# Patient Record
Sex: FEMALE | Race: WHITE | NOT HISPANIC OR LATINO
[De-identification: names, ages, dates, MRNs, and addresses within clinical notes are randomized per-mention and may not be internally consistent; named-entity substitution may affect disease eponyms.]

---

## 2019-11-26 PROBLEM — Z00.00 ENCOUNTER FOR PREVENTIVE HEALTH EXAMINATION: Status: ACTIVE | Noted: 2019-11-26

## 2019-12-02 ENCOUNTER — APPOINTMENT (OUTPATIENT)
Dept: GYNECOLOGIC ONCOLOGY | Facility: CLINIC | Age: 84
End: 2019-12-02
Payer: MEDICARE

## 2019-12-02 VITALS
OXYGEN SATURATION: 97 % | HEIGHT: 65 IN | DIASTOLIC BLOOD PRESSURE: 72 MMHG | HEART RATE: 78 BPM | SYSTOLIC BLOOD PRESSURE: 118 MMHG | BODY MASS INDEX: 24.66 KG/M2 | WEIGHT: 148 LBS

## 2019-12-02 DIAGNOSIS — N83.209 UNSPECIFIED OVARIAN CYST, UNSPECIFIED SIDE: ICD-10-CM

## 2019-12-02 DIAGNOSIS — R93.89 ABNORMAL FINDINGS ON DIAGNOSTIC IMAGING OF OTHER SPECIFIED BODY STRUCTURES: ICD-10-CM

## 2019-12-02 PROCEDURE — 58100 BIOPSY OF UTERUS LINING: CPT

## 2019-12-02 PROCEDURE — 99205 OFFICE O/P NEW HI 60 MIN: CPT | Mod: 25

## 2019-12-10 ENCOUNTER — TRANSCRIPTION ENCOUNTER (OUTPATIENT)
Age: 84
End: 2019-12-10

## 2019-12-10 NOTE — HISTORY OF PRESENT ILLNESS
[FreeTextEntry1] : Problem\par 1) Thickened endometrium\par 2) Simple Ovarian Cyst\par \par Previous Therapy\par 1) Pelvic US 11/4/19\par    a) Uterus 6.7cm, endometrium is irregular and extremely thickened 2.1cm with trace free fluid. R ovarian simple cyst 3.3cm\par \par 86 yo referred by Dr. Ge for a thickened endometrium and simple ovarian cyst. Patient denies any bleeding. Has issues with incontinence, s/p botox injections with Dr. Ge 2/2019 with a good result. Had US because she was having frequent UTIs and pelvic cramping. No pain now.

## 2019-12-10 NOTE — DISCUSSION/SUMMARY
[FreeTextEntry1] : I discussed with the patient ( with the aid of diagrams, reviewed the findings on history and physical examination, and reviewed the imaging studies in detail. She has a thickened endometrial lining with no abnormal uterine bleeding. \par \par Benign, pre-malignant (such as atypical hyperplasia) and malignant causes of these findings discussed. In order to determine the cause of her symptoms, sampling of the uterus is necessary. The uterus can be sampled either by an in-office endometrial biopsy or via D&C hysteroscopy. In the case of suspected endometrial polyp, a D&C/hysteroscopy is indicated. This allows for removal of the endometrial polyp. This is both diagnostic and therapeutic. \par \par Complications that include, but are not limited to: bleeding, infection and uterine perforation discussed. In the case of uterine perforation, diagnostic laparoscopy may be indicated. I have also provided her with the diagrams. \par \par Surgical scheduling was discussed and instructions for optimization prior to surgery were given. NPO after midnight.  No aspirin or NSAID products for 1 week prior. Instructions for misoprostol 48 hours prior to surgery given.  A copy of the above diagrams was given to the patient. \par \par Ovarian cyst - repeat pelvic US 2/2020 to address for change, not concerning, simple cyst patient asymptomatic\par []Follow up EMB, if negative can proceed with D&C or do saline infusion sonogram first\par \par The total encounter time was 60 minutes, of which over 50% was spent face-to-face, examining and counseling the patient, or coordinating care.  \par

## 2019-12-10 NOTE — PAST MEDICAL HISTORY
[Menarche Age ____] : age at menarche was [unfilled] [Postmenopausal] : The patient is postmenopausal [Approximately ___] : the LMP was approximately [unfilled] [Total Preg ___] : G[unfilled] [Live Births ___] : P[unfilled]  [FreeTextEntry5] : VAGINAL DELIVERY X2

## 2019-12-10 NOTE — PROCEDURE
[Endometrial Biopsy] : an endometrial biopsy [Thickened Endometrium] : thickened endometrium [None] : none [Verbal Consent] : verbal consent was obtained prior to the procedure and is detailed in the patient's record [Patient] : the patient [Yes] : the specimen was sent to pathology [No Complications] : none [FreeTextEntry1] : Uterus sounded to 5cm\par scant tissue

## 2021-11-30 ENCOUNTER — EMERGENCY (EMERGENCY)
Facility: HOSPITAL | Age: 86
LOS: 1 days | Discharge: HOME | End: 2021-11-30
Attending: EMERGENCY MEDICINE | Admitting: EMERGENCY MEDICINE
Payer: MEDICARE

## 2021-11-30 VITALS
HEIGHT: 63 IN | SYSTOLIC BLOOD PRESSURE: 170 MMHG | OXYGEN SATURATION: 97 % | DIASTOLIC BLOOD PRESSURE: 93 MMHG | WEIGHT: 130.07 LBS | TEMPERATURE: 98 F | HEART RATE: 81 BPM | RESPIRATION RATE: 18 BRPM
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
## 2021-11-30 DIAGNOSIS — S80.811A ABRASION, RIGHT LOWER LEG, INITIAL ENCOUNTER: ICD-10-CM

## 2021-11-30 DIAGNOSIS — L03.115 CELLULITIS OF RIGHT LOWER LIMB: ICD-10-CM

## 2021-11-30 DIAGNOSIS — Z23 ENCOUNTER FOR IMMUNIZATION: ICD-10-CM
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
## 2021-11-30 DIAGNOSIS — G20 PARKINSON'S DISEASE: ICD-10-CM

## 2021-11-30 PROCEDURE — 99283 EMERGENCY DEPT VISIT LOW MDM: CPT | Mod: 25

## 2021-11-30 PROCEDURE — 99283 EMERGENCY DEPT VISIT LOW MDM: CPT

## 2021-11-30 PROCEDURE — 90715 TDAP VACCINE 7 YRS/> IM: CPT

## 2021-11-30 PROCEDURE — 90471 IMMUNIZATION ADMIN: CPT

## 2021-11-30 RX ORDER — CEPHALEXIN 500 MG
500 CAPSULE ORAL ONCE
Refills: 0 | Status: COMPLETED | OUTPATIENT
Start: 2021-11-30 | End: 2021-11-30

## 2021-11-30 RX ORDER — CEPHALEXIN 500 MG
1 CAPSULE ORAL
Qty: 28 | Refills: 0
Start: 2021-11-30 | End: 2021-12-06

## 2021-11-30 RX ORDER — TETANUS TOXOID, REDUCED DIPHTHERIA TOXOID AND ACELLULAR PERTUSSIS VACCINE, ADSORBED 5; 2.5; 8; 8; 2.5 [IU]/.5ML; [IU]/.5ML; UG/.5ML; UG/.5ML; UG/.5ML
0.5 SUSPENSION INTRAMUSCULAR ONCE
Refills: 0 | Status: COMPLETED | OUTPATIENT
Start: 2021-11-30 | End: 2021-11-30

## 2021-11-30 RX ADMIN — Medication 500 MILLIGRAM(S): at 14:30

## 2021-11-30 RX ADMIN — TETANUS TOXOID, REDUCED DIPHTHERIA TOXOID AND ACELLULAR PERTUSSIS VACCINE, ADSORBED 0.5 MILLILITER(S): 5; 2.5; 8; 8; 2.5 SUSPENSION INTRAMUSCULAR at 14:30

## 2021-11-30 NOTE — ED PROVIDER NOTE - PATIENT PORTAL LINK FT
You can access the FollowMyHealth Patient Portal offered by [facility] by registering at the following website: [email or web address]. By joining [employer]’s FollowMyHealth portal, you will also be able to view your health information using other applications (apps) compatible with our system.

## 2021-11-30 NOTE — ED ADULT NURSE NOTE - CCCP TRG CHIEF CMPLNT
I just refilled them, and they went to [facility] as that was the only pharmacy listed in the chart.  Hopefully that was the right pharmacy.  If not, enter the correct pharmacy and I can send them again.  
Patient's insurance plan restricts the [employer] to another pharmacy. Please have order sent to pharmacy listed below.     Insurance Plan: [employer] Part D  Pharmacy: [facility]  Phone Number: [phone]  Patient Notified?: yes    Thank you,    [facility] Team  Phone: [phone]  Fax: [phone]  Pool: Pharm [facility]    FYI- this pharmacy won't do transfers so you will need to send them new prescriptions.  
leg wound

## 2021-11-30 NOTE — ED PROVIDER NOTE - PHYSICAL EXAMINATION
Vitals reviewed  Gen: nad, speaking in full sentences  Skin: wwp, no rash/lesions  HEENT: ncat, eomi, mmm  CV: rrr, no audible m/r/g  Resp: symmetrical expansion, ctab, no w/r/r  RLE: 1x1cm abrasion over the shin with 3cm circular area of erythema, mild overlying tenderness. No warmth, no discharge from wound. Full ROM of all joints, no calf tenderness. DP/PT pulses 2+. Cap refill intact.   Ext: FROM throughout, no peripheral edema  Neuro: alert/oriented, no focal deficits, steady gait Vitals reviewed  Gen: nad, speaking in full sentences  Skin: wwp  HEENT: ncat, eomi, mmm  CV: rrr, no audible m/r/g  Resp: symmetrical expansion, ctab, no w/r/r  RLE: 1x1cm abrasion over the shin with 3cm circular area of erythema, mild overlying tenderness. No warmth/fluctuance, no discharge from wound. Full ROM of all joints, no calf tenderness. DP/PT pulses 2+. Cap refill intact.   FROM all ext, NVI  Neuro: alert/oriented, no focal deficits, steady gait

## 2021-11-30 NOTE — ED PROVIDER NOTE - CLINICAL SUMMARY MEDICAL DECISION MAKING FREE TEXT BOX
86 y/o F PMHx Parkinson's disease, presenting with wound to the right shin after injury 10 days ago.  on exam pt afebrile, well appearing, RLE: 1x1cm abrasion over the shin with 3cm circular area of erythema, mild overlying tenderness. No warmth/fluctuance, no discharge from wound. Full ROM of all joints, no calf tenderness. DP/PT pulses 2+. Cap refill intact.  wound cleansed, area of erythema outline, tetanus updated and started of keflex for cellulitis.  instructed on wound care, f/u 3 days for wound check.  discussed strict return parameters

## 2021-11-30 NOTE — ED PROVIDER NOTE - OBJECTIVE STATEMENT
86 y/o F PMHx Parkinson's disease, presenting with wound to the right shin after injury 10 days ago. Pt [place] and scraped her shin on the edge of the step. No subsequent falls or head trauma. After arriving home, she noticed a bit of oozing blood and an abrasion to her shin. She washed out the wound and has been cleaning it with alcohol and applying Bacitracin cream daily since then. Reports redness over the wound for the past 9 days, improved since yesterday but wanted to receive evaluation for the injury. Pt c/o mild throbbing pain over the wound. Denies fevers, chills, discharge from the wound, limited ROM of extremities, numbness, weakness, calf pain or swelling. Last tetanus unknown.

## 2021-11-30 NOTE — ED ADULT NURSE NOTE - OBJECTIVE STATEMENT
Pt AOX4. Pt c/o RLE wound after cutting it on the step of the bus 10 days ago. Pt presents with wound to anterior side of RLE. No bleeding or drainage noted. Redness noted around site. +2 Pedal pulses b/l. Pt denies fevers, chills, n/v, SOB, chest pain, weakness. Pt speaking in full complete sentences. Respirations even and unlabored. Pt ambulates steadily with cane.

## 2021-11-30 NOTE — ED ADULT TRIAGE NOTE - CHIEF COMPLAINT QUOTE
Pt co RLE wound after cutting leg on public bus 10 days ago. Area warm to touch, erythema present. Denies f/c.

## 2021-11-30 NOTE — ED PROVIDER NOTE - ATTENDING CONTRIBUTION TO CARE
87 year old f presents to ED with concern for right leg wound sustained 10 days ago while getting onto the bus.  Notes scrape to right pretibial area.  She is cleaning daily and putting abx cream to area.  No fever or chills, no discharge from wound.  On exam, patient is non toxic.  HRRR, lungs clear.  1x1 cm abrasion with overlying granulation tissue with surrounding 3 cm x 3cm erythema.  No induration, no drainage.  Mild tenderness over abrasion.  Will clean, outline, treat w oral abx and recommend close wound care and follow up.

## 2021-12-06 ENCOUNTER — EMERGENCY (EMERGENCY)
Facility: HOSPITAL | Age: 86
LOS: 1 days | Discharge: HOME | End: 2021-12-06
Admitting: EMERGENCY MEDICINE
Payer: MEDICARE

## 2021-12-06 VITALS
RESPIRATION RATE: 16 BRPM | HEART RATE: 87 BPM | HEIGHT: 63 IN | TEMPERATURE: 98 F | OXYGEN SATURATION: 96 % | WEIGHT: 132.94 LBS | DIASTOLIC BLOOD PRESSURE: 80 MMHG | SYSTOLIC BLOOD PRESSURE: 170 MMHG

## 2021-12-06 DIAGNOSIS — R20.2 PARESTHESIA OF SKIN: ICD-10-CM

## 2021-12-06 DIAGNOSIS — G20 PARKINSON'S DISEASE: ICD-10-CM

## 2021-12-06 PROCEDURE — 99282 EMERGENCY DEPT VISIT SF MDM: CPT

## 2021-12-06 PROCEDURE — 99283 EMERGENCY DEPT VISIT LOW MDM: CPT

## 2021-12-06 NOTE — ED ADULT NURSE NOTE - OBJECTIVE STATEMENT
pt. presents to ED a&ox3 ambulatory c/o burning and tingling sensation to her hands and feet that began yesterday. Pt. was in the ED last week for a wound on her right shin that she was placed on Keflex for. Pt. has two more days left of taking Keflex. NKDA / no hx of prior allergic reactions to abx, medications, and food. Pt. denies numbness. Pt. ambulates with steady gait, no shuffling noted. Pt. speech is clear and coherent no garbled. P.t denies fevers, N/V/D, chills. On assessment, radial and pedal pulses strong and equal BL, sensation equal BL, color is normaol for race, skin is intact, warm to touch, cap refill brisk.

## 2021-12-06 NOTE — ED PROVIDER NOTE - CLINICAL SUMMARY MEDICAL DECISION MAKING FREE TEXT BOX
The patient is a 88 yo female who presents complaining of bilateral hand and feet burning/tingling sensation after taking Keflex. Given the wound is well healing and there are no signs of infection, patient was advised to discontinue Keflex. Patient was advised to follow up with PMD in 1-2 days to discuss tinging/burning sensation. Patient was provided strict return precautions including to return to the ER for worsening signs of infection including fever, increased redness, warmth or discharge. The patient is a 88 yo female who presents complaining of bilateral hand and feet burning/tingling sensation after taking Keflex. Extremities neurovascularly intact. Her symptoms have improved since onset yesterday. She has no known history of diabetes or vascular disorder. Possible medication side effect (although no reported cases of neuropathy with cephalexin) vs neuropathic sx related to Parkinson's. Given the wound is well healing and there are no signs of infection, patient was advised to discontinue Keflex. She will continue wound care and bacitracin ointment.  Patient was advised to follow up with PMD in 1-2 days to discuss tinging/burning sensation. Patient was provided strict return precautions including to return to the ER for worsening signs of infection including fever, increased redness, warmth or discharge.

## 2021-12-06 NOTE — ED PROVIDER NOTE - MUSCULOSKELETAL, MLM
No bony tenderness to b/l hands and feet. Full ROM of hands/feet No bony tenderness to b/l hands and feet. Full ROM of hands/feet. No color change. Hands and feet warm and well perfused. Distal sensation intact. Cap refill <2 sec.

## 2021-12-06 NOTE — ED PROVIDER NOTE - OBJECTIVE STATEMENT
The patient is a 88 yo female who presents complaining of bilateral hand/feet burning/tingling sensation. The patient was evaluated in this ER on 6 days ago after sustaining a wound to her right lower extremity. The patient was started on Keflex and advised to follow up for a wound check. The patient reports 2 days she was evaluated at [facility] for a wound check and reported significant improvement in her wound. The patient reports that last night, about 5 hours after taking her dose Keflex she developed sudden onset burning/tingling to her bilateral palms of her hands and soles of her feet. She reports that standing in cold water was the only thing that helped her symptoms. She reports taking Alleve for her pain without significant improvement in her symptoms. She presents to the ER for further evaluation. The patient is a 88 yo female with recent diagnosis of ?Parkinson's who presents complaining of bilateral hand/feet burning/tingling sensation. The patient was evaluated in this ER on 6 days ago after sustaining a wound to her right lower extremity. The patient was started on Keflex and advised to follow up for a wound check. The patient reports 2 days  ago she was evaluated at [facility] for a wound check and reported significant improvement in her wound. The patient reports that last night, about 5 hours after taking her dose Keflex she developed burning/tingling to her bilateral palms of her hands and soles of her feet. She reports that standing in cold water was the only thing that helped her symptoms. She reports taking Aleve for her pain without significant improvement in her symptoms. She denies history of similar symptoms, history of DM or vascular disease, other new medications, chest pain, shortness of breath, limb swelling.

## 2021-12-06 NOTE — ED PROVIDER NOTE - PATIENT PORTAL LINK FT
You can access the FollowMyHealth Patient Portal offered by [facility] by registering at the following website: [email or web address]. By joining [employer]’s FollowMyHealth portal, you will also be able to view your health information using other applications (apps) compatible with our system.

## 2021-12-06 NOTE — ED PROVIDER NOTE - NSFOLLOWUPINSTRUCTIONS_ED_ALL_ED_FT
Please stop keflex.    Continue wound care at home. Apply thin layer of bacitracin ointment twice daily.    Please follow up with your primary care doctor in 2-3 days for re-evaluation.    Return to the emergency department if you develop fever>100.4F, swelling of hands or feet, chest pain, shortness of breath, weakness of extremities, or any other concerns.

## 2021-12-06 NOTE — ED ADULT NURSE REASSESSMENT NOTE - NS ED NURSE REASSESS COMMENT FT1
DELMI Aguilar created plan with pt. to stop taking the Keflex, placed new bandage on rt. shin wound, and told pt to f/u with pcp. Pt. verbalized understanding

## 2021-12-06 NOTE — ED ADULT TRIAGE NOTE - CHIEF COMPLAINT QUOTE
Pt walked into ED c/o itchying/burning of hands and feet starting last night. pt with RLE wound that she was started on keflex for. Wound improving, 2 days left of abx. Pt did not take today's dose and she is concerned the pain is related to the antibiotics. Denies hives, swelling, or difficulty breathing. Pt endorses bottom of feet being discolored. No hx of DM or neuropathy.

## 2021-12-06 NOTE — ED ADULT NURSE NOTE - NSIMPLEMENTINTERV_GEN_ALL_ED
Implemented All Fall with Harm Risk Interventions:  [place] to call system. Call bell, personal items and telephone within reach. Instruct patient to call for assistance. Room bathroom lighting operational. Non-slip footwear when patient is off stretcher. Physically safe environment: no spills, clutter or unnecessary equipment. Stretcher in lowest position, wheels locked, appropriate side rails in place. Provide visual cue, wrist band, yellow gown, etc. Monitor gait and stability. Monitor for mental status changes and reorient to person, place, and time. Review medications for side effects contributing to fall risk. Reinforce activity limits and safety measures with patient and family. Provide visual clues: red socks.

## 2022-08-20 NOTE — ED PROVIDER NOTE - NSFOLLOWUPINSTRUCTIONS_ED_ALL_ED_FT
no nausea/no vomiting
Continue with wound care as you have been and continue applying antibiotic cream.  Take oral antibiotics as prescribed.  Follow up in 3 days to have wound checked.  Return sooner if redness extends outside the marked area    Cellulitis    Cellulitis is a skin infection caused by bacteria. This condition occurs most often in the arms and lower legs but can occur anywhere over the body. Symptoms include redness, swelling, warm skin, tenderness, and chills/fever. If you were prescribed an antibiotic medicine, take it as told by your health care provider. Do not stop taking the antibiotic even if you start to feel better.    SEEK IMMEDIATE MEDICAL CARE IF YOU HAVE ANY OF THE FOLLOWING SYMPTOMS: worsening fever, red streaks coming from affected area, vomiting or diarrhea, or dizziness/lightheadedness.

## 2023-10-07 ENCOUNTER — NON-APPOINTMENT (OUTPATIENT)
Age: 88
End: 2023-10-07

## 2023-10-10 ENCOUNTER — APPOINTMENT (OUTPATIENT)
Dept: NEPHROLOGY | Facility: CLINIC | Age: 88
End: 2023-10-10
Payer: MEDICARE

## 2023-10-10 VITALS — HEART RATE: 78 BPM | DIASTOLIC BLOOD PRESSURE: 76 MMHG | SYSTOLIC BLOOD PRESSURE: 146 MMHG

## 2023-10-10 DIAGNOSIS — I10 ESSENTIAL (PRIMARY) HYPERTENSION: ICD-10-CM

## 2023-10-10 DIAGNOSIS — R79.89 OTHER SPECIFIED ABNORMAL FINDINGS OF BLOOD CHEMISTRY: ICD-10-CM

## 2023-10-10 PROCEDURE — 93784 AMBL BP MNTR W/SOFTWARE: CPT

## 2023-10-10 PROCEDURE — 99204 OFFICE O/P NEW MOD 45 MIN: CPT | Mod: 25

## 2023-10-10 RX ORDER — NAPROXEN SODIUM 220 MG
TABLET ORAL
Refills: 0 | Status: DISCONTINUED | COMMUNITY
End: 2023-10-10

## 2023-10-10 RX ORDER — NITROFURANTOIN MACROCRYSTALS 100 MG/1
100 CAPSULE ORAL
Refills: 0 | Status: DISCONTINUED | COMMUNITY
End: 2023-10-10

## 2023-10-10 RX ORDER — ZOLPIDEM TARTRATE 5 MG/1
5 TABLET, FILM COATED ORAL
Refills: 0 | Status: ACTIVE | COMMUNITY

## 2023-10-10 RX ORDER — AMLODIPINE BESYLATE 5 MG/1
5 TABLET ORAL
Refills: 0 | Status: ACTIVE | COMMUNITY

## 2023-10-12 LAB
ALBUMIN SERPL ELPH-MCNC: 4.7 G/DL
ALDOSTERONE SERUM: 19.2 NG/DL
ANION GAP SERPL CALC-SCNC: 11 MMOL/L
APPEARANCE: CLEAR
BACTERIA: NEGATIVE /HPF
BILIRUBIN URINE: NEGATIVE
BLOOD URINE: NEGATIVE
BUN SERPL-MCNC: 23 MG/DL
CALCIUM SERPL-MCNC: 10.1 MG/DL
CAST: 0 /LPF
CHLORIDE SERPL-SCNC: 103 MMOL/L
CO2 SERPL-SCNC: 26 MMOL/L
COLOR: YELLOW
CREAT SERPL-MCNC: 1.21 MG/DL
CREAT SPEC-SCNC: 99 MG/DL
EGFR: 43 ML/MIN/1.73M2
EPITHELIAL CELLS: 2 /HPF
GLUCOSE QUALITATIVE U: NEGATIVE MG/DL
GLUCOSE SERPL-MCNC: 104 MG/DL
HCT VFR BLD CALC: 43.8 %
HGB BLD-MCNC: 13.4 G/DL
KETONES URINE: NEGATIVE MG/DL
LEUKOCYTE ESTERASE URINE: ABNORMAL
MCHC RBC-ENTMCNC: 27.2 PG
MCHC RBC-ENTMCNC: 30.6 GM/DL
MCV RBC AUTO: 89 FL
MICROALBUMIN 24H UR DL<=1MG/L-MCNC: 3.2 MG/DL
MICROALBUMIN/CREAT 24H UR-RTO: 32 MG/G
MICROSCOPIC-UA: NORMAL
NITRITE URINE: NEGATIVE
PH URINE: 6
PHOSPHATE SERPL-MCNC: 3.6 MG/DL
PLATELET # BLD AUTO: 297 K/UL
POTASSIUM SERPL-SCNC: 4.5 MMOL/L
PROTEIN URINE: NEGATIVE MG/DL
RBC # BLD: 4.92 M/UL
RBC # FLD: 14.4 %
RED BLOOD CELLS URINE: 0 /HPF
REVIEW: NORMAL
SODIUM SERPL-SCNC: 140 MMOL/L
SPECIFIC GRAVITY URINE: 1.02
TSH SERPL-ACNC: 2.32 UIU/ML
UROBILINOGEN URINE: 0.2 MG/DL
WBC # FLD AUTO: 6.04 K/UL
WHITE BLOOD CELLS URINE: 3 /HPF

## 2023-11-01 LAB
DOPAMINE UR-MCNC: <30 PG/ML
EPINEPH UR-MCNC: <15 PG/ML
METANEPHRINE, PL: 15.6 PG/ML
NOREPINEPH UR-MCNC: 687 PG/ML
NORMETANEPHRINE, PL: 218.5 PG/ML
RENIN ACTIVITY, PLASMA: 1.7 NG/ML/HR

## 2024-12-03 ENCOUNTER — RX ONLY (RX ONLY)
Age: 89
End: 2024-12-03

## 2024-12-03 ENCOUNTER — OFFICE (OUTPATIENT)
Dept: URBAN - METROPOLITAN AREA CLINIC 28 | Facility: CLINIC | Age: 89
Setting detail: OPHTHALMOLOGY
End: 2024-12-03
Payer: COMMERCIAL

## 2024-12-03 DIAGNOSIS — H25.13: ICD-10-CM

## 2024-12-03 DIAGNOSIS — H01.002: ICD-10-CM

## 2024-12-03 DIAGNOSIS — H01.004: ICD-10-CM

## 2024-12-03 DIAGNOSIS — H01.005: ICD-10-CM

## 2024-12-03 DIAGNOSIS — H01.001: ICD-10-CM

## 2024-12-03 DIAGNOSIS — H16.223: ICD-10-CM

## 2024-12-03 DIAGNOSIS — H43.813: ICD-10-CM

## 2024-12-03 PROCEDURE — 92014 COMPRE OPH EXAM EST PT 1/>: CPT | Performed by: OPHTHALMOLOGY

## 2024-12-03 ASSESSMENT — REFRACTION_AUTOREFRACTION
OD_SPHERE: +2.50
OD_CYLINDER: -1.00
OD_AXIS: 077
OS_CYLINDER: -2.75
OS_SPHERE: +3.50
OS_AXIS: 108

## 2024-12-03 ASSESSMENT — REFRACTION_CURRENTRX
OS_OVR_VA: 20/
OS_AXIS: 129
OD_CYLINDER: -0.25
OD_VPRISM_DIRECTION: SV
OS_CYLINDER: -0.25
OD_OVR_VA: 20/
OD_AXIS: 099
OS_VPRISM_DIRECTION: SV
OD_SPHERE: +5.00
OS_SPHERE: +5.00

## 2024-12-03 ASSESSMENT — TONOMETRY
OS_IOP_MMHG: 15
OD_IOP_MMHG: 15

## 2024-12-03 ASSESSMENT — KERATOMETRY
OS_K2POWER_DIOPTERS: 44.25
OS_AXISANGLE_DEGREES: 180
OD_AXISANGLE_DEGREES: 008
OS_K1POWER_DIOPTERS: 42.50
OD_K1POWER_DIOPTERS: 42.75
OD_K2POWER_DIOPTERS: 43.25

## 2024-12-03 ASSESSMENT — LID EXAM ASSESSMENTS
OD_BLEPHARITIS: RLL RUL 1+
OS_BLEPHARITIS: LLL LUL 1+

## 2024-12-03 ASSESSMENT — VISUAL ACUITY
OD_BCVA: 20/40-1
OS_BCVA: 20/60+2

## 2024-12-03 ASSESSMENT — LID POSITION - PTOSIS
OD_PTOSIS: 2+
OS_PTOSIS: 1+

## 2024-12-03 ASSESSMENT — TEAR BREAK UP TIME (TBUT)
OD_TBUT: 1+
OS_TBUT: 1+

## 2024-12-03 ASSESSMENT — CONFRONTATIONAL VISUAL FIELD TEST (CVF)
OS_FINDINGS: FULL
OD_FINDINGS: FULL

## 2025-05-13 ENCOUNTER — OFFICE (OUTPATIENT)
Dept: URBAN - METROPOLITAN AREA CLINIC 28 | Facility: CLINIC | Age: OVER 89
Setting detail: OPHTHALMOLOGY
End: 2025-05-13
Payer: MEDICARE

## 2025-05-13 DIAGNOSIS — H01.004: ICD-10-CM

## 2025-05-13 DIAGNOSIS — H02.403: ICD-10-CM

## 2025-05-13 DIAGNOSIS — H16.223: ICD-10-CM

## 2025-05-13 DIAGNOSIS — H01.002: ICD-10-CM

## 2025-05-13 DIAGNOSIS — H01.001: ICD-10-CM

## 2025-05-13 DIAGNOSIS — H25.13: ICD-10-CM

## 2025-05-13 DIAGNOSIS — H43.813: ICD-10-CM

## 2025-05-13 DIAGNOSIS — H01.005: ICD-10-CM

## 2025-05-13 PROCEDURE — 92014 COMPRE OPH EXAM EST PT 1/>: CPT | Performed by: OPHTHALMOLOGY

## 2025-05-13 ASSESSMENT — REFRACTION_AUTOREFRACTION
OS_SPHERE: +3.75
OD_SPHERE: +3.00
OD_AXIS: 083
OS_AXIS: 106
OD_CYLINDER: -1.00
OS_CYLINDER: -3.00

## 2025-05-13 ASSESSMENT — REFRACTION_CURRENTRX
OD_AXIS: 099
OS_VPRISM_DIRECTION: SV
OS_CYLINDER: -0.25
OD_VPRISM_DIRECTION: SV
OS_AXIS: 129
OD_OVR_VA: 20/
OS_OVR_VA: 20/
OD_SPHERE: +5.00
OS_SPHERE: +5.00
OD_CYLINDER: -0.25

## 2025-05-13 ASSESSMENT — LID POSITION - PTOSIS
OD_PTOSIS: 2+
OS_PTOSIS: 1+

## 2025-05-13 ASSESSMENT — LID EXAM ASSESSMENTS
OS_BLEPHARITIS: LLL LUL 1+
OD_BLEPHARITIS: RLL RUL 1+

## 2025-05-13 ASSESSMENT — KERATOMETRY
OS_K2POWER_DIOPTERS: 44.00
OD_K2POWER_DIOPTERS: 43.00
OD_K1POWER_DIOPTERS: 41.50
OD_AXISANGLE_DEGREES: 007
OS_AXISANGLE_DEGREES: 003
OS_K1POWER_DIOPTERS: 42.50

## 2025-05-13 ASSESSMENT — CONFRONTATIONAL VISUAL FIELD TEST (CVF)
OD_FINDINGS: FULL
OS_FINDINGS: FULL

## 2025-05-13 ASSESSMENT — VISUAL ACUITY
OS_BCVA: 20/100
OD_BCVA: 20/50

## 2025-05-13 ASSESSMENT — TONOMETRY
OS_IOP_MMHG: 12
OD_IOP_MMHG: 12

## 2025-05-13 ASSESSMENT — TEAR BREAK UP TIME (TBUT)
OD_TBUT: 1+
OS_TBUT: 1+

## 2025-06-03 ENCOUNTER — OFFICE (OUTPATIENT)
Dept: URBAN - METROPOLITAN AREA CLINIC 28 | Facility: CLINIC | Age: OVER 89
Setting detail: OPHTHALMOLOGY
End: 2025-06-03
Payer: MEDICARE

## 2025-06-03 DIAGNOSIS — H16.223: ICD-10-CM

## 2025-06-03 DIAGNOSIS — H01.005: ICD-10-CM

## 2025-06-03 DIAGNOSIS — H25.13: ICD-10-CM

## 2025-06-03 DIAGNOSIS — H01.004: ICD-10-CM

## 2025-06-03 DIAGNOSIS — H01.001: ICD-10-CM

## 2025-06-03 DIAGNOSIS — H01.002: ICD-10-CM

## 2025-06-03 DIAGNOSIS — H02.403: ICD-10-CM

## 2025-06-03 PROCEDURE — 92012 INTRM OPH EXAM EST PATIENT: CPT | Performed by: OPHTHALMOLOGY

## 2025-06-03 ASSESSMENT — REFRACTION_CURRENTRX
OD_VPRISM_DIRECTION: SV
OS_OVR_VA: 20/
OD_AXIS: 099
OS_VPRISM_DIRECTION: SV
OD_OVR_VA: 20/
OS_AXIS: 129
OD_CYLINDER: -0.25
OS_SPHERE: +5.00
OD_SPHERE: +5.00
OS_CYLINDER: -0.25

## 2025-06-03 ASSESSMENT — LID EXAM ASSESSMENTS
OS_BLEPHARITIS: LLL LUL 1+
OD_BLEPHARITIS: RLL RUL 1+

## 2025-06-03 ASSESSMENT — LID POSITION - PTOSIS
OS_PTOSIS: 1+
OD_PTOSIS: 2+

## 2025-06-03 ASSESSMENT — VISUAL ACUITY
OS_BCVA: 20/50-1
OD_BCVA: 20/40-2

## 2025-06-03 ASSESSMENT — TONOMETRY
OD_IOP_MMHG: 14
OS_IOP_MMHG: 13

## 2025-06-03 ASSESSMENT — KERATOMETRY
OD_K2POWER_DIOPTERS: 43.25
OS_K1POWER_DIOPTERS: 42.50
OD_AXISANGLE_DEGREES: 176
OS_AXISANGLE_DEGREES: 178
OD_K1POWER_DIOPTERS: 42.00
OS_K2POWER_DIOPTERS: 44.25

## 2025-06-03 ASSESSMENT — REFRACTION_AUTOREFRACTION
OD_CYLINDER: -1.25
OS_CYLINDER: -3.00
OS_SPHERE: +3.75
OS_AXIS: 102
OD_AXIS: 068
OD_SPHERE: +2.75

## 2025-06-03 ASSESSMENT — TEAR BREAK UP TIME (TBUT)
OS_TBUT: 1+
OD_TBUT: 1+

## 2025-06-03 ASSESSMENT — CONFRONTATIONAL VISUAL FIELD TEST (CVF)
OD_FINDINGS: FULL
OS_FINDINGS: FULL